# Patient Record
Sex: FEMALE | Race: WHITE | NOT HISPANIC OR LATINO | Employment: UNEMPLOYED | URBAN - NONMETROPOLITAN AREA
[De-identification: names, ages, dates, MRNs, and addresses within clinical notes are randomized per-mention and may not be internally consistent; named-entity substitution may affect disease eponyms.]

---

## 2019-08-07 ENCOUNTER — OFFICE VISIT (OUTPATIENT)
Dept: SURGERY | Facility: CLINIC | Age: 29
End: 2019-08-07

## 2019-08-07 ENCOUNTER — OFFICE VISIT (OUTPATIENT)
Dept: FAMILY MEDICINE CLINIC | Facility: CLINIC | Age: 29
End: 2019-08-07

## 2019-08-07 VITALS
BODY MASS INDEX: 18.27 KG/M2 | DIASTOLIC BLOOD PRESSURE: 60 MMHG | TEMPERATURE: 98 F | HEIGHT: 67 IN | WEIGHT: 116.4 LBS | HEART RATE: 72 BPM | SYSTOLIC BLOOD PRESSURE: 110 MMHG

## 2019-08-07 VITALS
SYSTOLIC BLOOD PRESSURE: 110 MMHG | HEIGHT: 67 IN | BODY MASS INDEX: 18.02 KG/M2 | OXYGEN SATURATION: 98 % | HEART RATE: 85 BPM | DIASTOLIC BLOOD PRESSURE: 80 MMHG | WEIGHT: 114.8 LBS

## 2019-08-07 DIAGNOSIS — N64.4 PAINFUL BREASTS: Primary | ICD-10-CM

## 2019-08-07 DIAGNOSIS — R92.8 ABNORMAL MAMMOGRAM OF LEFT BREAST: Primary | ICD-10-CM

## 2019-08-07 PROCEDURE — 99202 OFFICE O/P NEW SF 15 MIN: CPT | Performed by: NURSE PRACTITIONER

## 2019-08-07 PROCEDURE — 99202 OFFICE O/P NEW SF 15 MIN: CPT | Performed by: SURGERY

## 2019-08-07 RX ORDER — IBUPROFEN 800 MG/1
800 TABLET ORAL EVERY 6 HOURS PRN
COMMUNITY

## 2019-08-07 RX ORDER — LIDOCAINE HYDROCHLORIDE AND EPINEPHRINE 10; 10 MG/ML; UG/ML
30 INJECTION, SOLUTION INFILTRATION; PERINEURAL ONCE
Status: CANCELLED | OUTPATIENT
Start: 2019-08-07 | End: 2019-08-07

## 2019-08-07 RX ORDER — DIAZEPAM 2 MG/1
5 TABLET ORAL ONCE
Status: CANCELLED | OUTPATIENT
Start: 2019-08-07 | End: 2019-08-07

## 2019-08-07 RX ORDER — OXYCODONE HYDROCHLORIDE AND ACETAMINOPHEN 5; 325 MG/1; MG/1
1 TABLET ORAL ONCE
Status: CANCELLED | OUTPATIENT
Start: 2019-08-07 | End: 2019-08-07

## 2019-08-07 NOTE — PROGRESS NOTES
"Subjective   Tamara Arenas is a 29 y.o. female.     [facility] Visit    PCP: none listed    CC: \"lumps on breasts\"      Breast Problem   This is a chronic problem. Episode onset: started after she finished breastfeeding about 4 years ago--has had pain and \"lumps\" that have caused concern.  Denies nipple discharge.   The problem occurs daily. The problem has been waxing and waning (worse during ovulation). Pertinent negatives include no abdominal pain, anorexia, arthralgias, change in bowel habit, chest pain, chills, congestion, coughing, diaphoresis, fatigue, fever, headaches, joint swelling, myalgias, nausea, neck pain, numbness, rash, sore throat, swollen glands, urinary symptoms, vertigo, visual change, vomiting or weakness. She has tried nothing (last exam was 4 years ago and was told it was nothing to worry about, never had u/s) for the symptoms.        The following portions of the patient's history were reviewed and updated as appropriate: allergies, current medications, past medical history, past social history, past surgical history and problem list.    Review of Systems   Constitutional: Negative for chills, diaphoresis, fatigue and fever.   HENT: Negative for congestion and sore throat.    Respiratory: Negative for cough.    Cardiovascular: Negative for chest pain.   Gastrointestinal: Negative for abdominal pain, anorexia, change in bowel habit, nausea and vomiting.   Genitourinary: Positive for breast lump and breast pain. Negative for breast discharge and menstrual problem.   Musculoskeletal: Negative for arthralgias, joint swelling, myalgias and neck pain.   Skin: Negative for rash.   Neurological: Negative for dizziness, vertigo, weakness, numbness and headache.   Psychiatric/Behavioral: The patient is nervous/anxious ( long history).      /80   Pulse 85   Ht 170.2 cm (67\")   Wt 52.1 kg (114 lb 12.8 oz)   LMP 08/07/2019 (Exact Date)   SpO2 98%   BMI 17.98 kg/m²     Objective "   Physical Exam   Constitutional: She is oriented to person, place, and time. She appears well-developed and well-nourished. No distress ( very anxious).   Cardiovascular: Normal rate and regular rhythm.   Pulmonary/Chest: Effort normal and breath sounds normal. She has no wheezes. She has no rales.   Neurological: She is alert and oriented to person, place, and time.   Skin: No rash noted.   Psychiatric: Her mood appears anxious ( very anxious, tearful at times).   Nursing note and vitals reviewed.  Breasts: breasts appear normal, no skin or nipple changes or axillary nodes, symmetric fibrous changes in both upper outer quadrants.  Multiple cystic lesions noted on exam bilaterally with tenderness upon palpation.     No results found for this or any previous visit (from the past 24 hour(s)).  No Images in the past 120 days found..      Assessment/Plan   Tamara was seen today for breast problem.    Diagnoses and all orders for this visit:    Painful breasts  -     US breast bilateral complete      Bilateral u/s scheduled for today--will call with results when available.   Discussed probable fibrocystic changes.     Encouraged to establish with PCP for ongoing care/management.

## 2019-08-07 NOTE — PATIENT INSTRUCTIONS

## 2019-08-07 NOTE — PATIENT INSTRUCTIONS
Breast Tenderness  Breast tenderness is a common problem for women of all ages. Breast tenderness may cause mild discomfort to severe pain. The pain usually comes and goes in association with your menstrual cycle, but it can be constant. Breast tenderness has many possible causes, including hormone changes and some medicines. Your health care provider may order tests, such as a mammogram or an ultrasound, to check for any unusual findings. Having breast tenderness usually does not mean that you have breast cancer.  Follow these instructions at home:  Sometimes, reassurance that you do not have breast cancer is all that is needed. In general, follow these home care instructions:  Managing pain and discomfort    · If directed, apply ice to the area:  ? Put ice in a plastic bag.  ? Place a towel between your skin and the bag.  ? Leave the ice on for 20 minutes, 2-3 times a day.  · Make sure you are wearing a supportive bra, especially during exercise. You may also want to wear a supportive bra while sleeping if your breasts are very tender.  Medicines  · Take over-the-counter and prescription medicines only as told by your health care provider. If the cause of your pain is infection, you may be prescribed an antibiotic medicine.  · If you were prescribed an antibiotic, take it as told by your health care provider. Do not stop taking the antibiotic even if you start to feel better.  General instructions    · Your health care provider may recommend that you reduce the amount of fat in your diet. You can do this by:  ? Limiting fried foods.  ? Cooking foods using methods, such as baking, boiling, grilling, and broiling.  · Decrease the amount of caffeine in your diet. You can do this by drinking more water and choosing caffeine-free options.  · Keep a log of the days and times when your breasts are most tender.  · Ask your health care provider how to do breast exams at home. This will help you notice if you have an  unusual growth or lump.  Contact a health care provider if:  · Any part of your breast is hard, red, and hot to the touch. This may be a sign of infection.  · You are not breastfeeding and you have fluid, especially blood or pus, coming out of your nipples.  · You have a fever.  · You have a new or painful lump in your breast that remains after your menstrual period ends.  · Your pain does not improve or it gets worse.  · Your pain is interfering with your daily activities.  This information is not intended to replace advice given to you by your health care provider. Make sure you discuss any questions you have with your health care provider.  Document Released: 11/30/2009 Document Revised: 09/15/2017 Document Reviewed: 09/15/2017  [employer] Interactive Patient Education © 2019 [employer]

## 2019-08-08 ENCOUNTER — HOSPITAL ENCOUNTER (OUTPATIENT)
Dept: ULTRASOUND IMAGING | Facility: HOSPITAL | Age: 29
Discharge: HOME | End: 2019-08-08
Admitting: SURGERY

## 2019-08-08 VITALS
OXYGEN SATURATION: 100 % | RESPIRATION RATE: 20 BRPM | BODY MASS INDEX: 17.68 KG/M2 | DIASTOLIC BLOOD PRESSURE: 57 MMHG | HEIGHT: 67 IN | WEIGHT: 112.66 LBS | HEART RATE: 86 BPM | TEMPERATURE: 97.3 F | SYSTOLIC BLOOD PRESSURE: 110 MMHG

## 2019-08-08 DIAGNOSIS — R92.8 ABNORMAL MAMMOGRAM OF LEFT BREAST: ICD-10-CM

## 2019-08-08 PROCEDURE — A4648 IMPLANTABLE TISSUE MARKER: HCPCS

## 2019-08-08 PROCEDURE — 88305 TISSUE EXAM BY PATHOLOGIST: CPT | Performed by: PATHOLOGY

## 2019-08-08 PROCEDURE — 19083 BX BREAST 1ST LESION US IMAG: CPT | Performed by: SURGERY

## 2019-08-08 PROCEDURE — 88305 TISSUE EXAM BY PATHOLOGIST: CPT | Performed by: SURGERY

## 2019-08-08 RX ORDER — SODIUM CHLORIDE 9 MG/ML
100 INJECTION, SOLUTION INTRAVENOUS CONTINUOUS
Status: DISCONTINUED | OUTPATIENT
Start: 2019-08-08 | End: 2019-08-09 | Stop reason: HOSPADM

## 2019-08-08 RX ORDER — DIAZEPAM 5 MG/1
5 TABLET ORAL ONCE
Status: COMPLETED | OUTPATIENT
Start: 2019-08-08 | End: 2019-08-08

## 2019-08-08 RX ORDER — OXYCODONE HYDROCHLORIDE AND ACETAMINOPHEN 5; 325 MG/1; MG/1
1 TABLET ORAL ONCE
Status: COMPLETED | OUTPATIENT
Start: 2019-08-08 | End: 2019-08-08

## 2019-08-08 RX ORDER — LIDOCAINE HYDROCHLORIDE AND EPINEPHRINE 10; 10 MG/ML; UG/ML
30 INJECTION, SOLUTION INFILTRATION; PERINEURAL ONCE
Status: COMPLETED | OUTPATIENT
Start: 2019-08-08 | End: 2019-08-08

## 2019-08-08 RX ADMIN — SODIUM CHLORIDE 100 ML/HR: 900 INJECTION, SOLUTION INTRAVENOUS at 12:00

## 2019-08-08 RX ADMIN — LIDOCAINE HYDROCHLORIDE,EPINEPHRINE BITARTRATE 20 ML: 10; .01 INJECTION, SOLUTION INFILTRATION; PERINEURAL at 11:43

## 2019-08-08 RX ADMIN — DIAZEPAM 5 MG: 5 TABLET ORAL at 11:18

## 2019-08-08 RX ADMIN — OXYCODONE HYDROCHLORIDE AND ACETAMINOPHEN 1 TABLET: 5; 325 TABLET ORAL at 11:18

## 2019-08-08 NOTE — PROGRESS NOTES
Chief Complaint: This is a 29 y.o. woman seen in consultation for abnormal breast imaging LEFT side.    History of Present Illness:  Patient has noted no new masses, skin changes, nipple discharge, nipple changes prior to her most recent imaging.  Her most recent imaging includes the following:   Study Result        PROCEDURE: US BREAST BILATERAL     TECHNIQUE:  Grayscale and color Doppler ultrasound imaging of the  palpable areas of concern in both breasts     COMPARISON: None     HISTORY: bilateral breast pain, knots, N64.4 Mastodynia     FINDINGS:  Imaging of the right breast was performed at each clock face in a  radial fashion, including the 3:00 axis area of palpable concern  showing no abnormality. Additional imaging shows a hypoechoic  nodule with smooth margins and gentle lobulations without  posterior acoustic shadowing, oriented wider than tall, located  in the 9:00 axis, 9 cm from the nipple, probably representing a  fibroadenoma.     Imaging of the left breast was also performed at each clock face  and radial fashion. Imaging of the palpable area of concern of  the left breast in the 11:00 axis shows a hypoechoic irregular  shaped nodule with irregular margins and internal punctate  echogenic foci measuring 0.7 x 2.5 x 2 cm, not clearly benign and  mildly suspicious for neoplasm. Ultrasound-guided biopsy is  recommended for this lesion.     IMPRESSION:  CONCLUSION:   Imaging of the palpable area of concern of the left breast in the  11:00 axis shows a hypoechoic irregular shaped nodule with  irregular margins and internal punctate echogenic foci measuring  0.7 x 2.5 x 2 cm, not clearly benign and mildly suspicious for  neoplasm. Ultrasound-guided biopsy is recommended for this  lesion.     Probable fibroadenoma in the 9:00 axis of the right breast, 9 cm  from the nipple. Recommend six month follow-up.      BI-RADS Category 4: Suspicious abnormality.  Biopsy should be  considered.     Findings and  recommendations  discussed with Dr. Weaver on 8/7/2019  3:35 PM CDT     Electronically signed by:  Iron Fink MD  8/7/2019 3:35 PM CDT  Workstation: [id number]     ( I have personally reviewed the breast imaging and concur with the findings of the radiologist- BiRADS 4)    Breast Cancer History: None.  Breast Operations, and year: None  She has her uterus and ovaries, is postmenopausal, and takes nor hormones.    She is here for evaluation.    No past medical history on file.  No past surgical history on file.  Prior to Admission medications    Medication Sig Start Date End Date Taking? Authorizing Provider   ibuprofen (ADVIL,MOTRIN) 800 MG tablet Take 800 mg by mouth Every 6 (Six) Hours As Needed for Mild Pain .    Provider, MD Yazan     No Known Allergies  No family history on file.  Social History     Socioeconomic History   • Marital status:      Spouse name: Not on file   • Number of children: Not on file   • Years of education: Not on file   • Highest education level: Not on file   Tobacco Use   • Smoking status: Former Smoker   • Smokeless tobacco: Never Used   Substance and Sexual Activity   • Alcohol use: No     Frequency: Never   • Drug use: No   • Sexual activity: Not Currently     Review of Systems   Constitutional: Negative for appetite change, chills, fever and unexpected weight change.   HENT: Negative for hearing loss, nosebleeds and trouble swallowing.    Eyes: Negative for visual disturbance.   Respiratory: Negative for apnea, cough, choking, chest tightness, shortness of breath, wheezing and stridor.    Cardiovascular: Negative for chest pain, palpitations and leg swelling.   Gastrointestinal: Negative for abdominal distention, abdominal pain, blood in stool, constipation, diarrhea, nausea and vomiting.   Endocrine: Negative for cold intolerance, heat intolerance, polydipsia, polyphagia and polyuria.   Genitourinary: Negative for difficulty urinating, dysuria, frequency, hematuria and  urgency.   Musculoskeletal: Negative for arthralgias, back pain, myalgias and neck pain.   Skin: Negative for color change, pallor and rash.   Allergic/Immunologic: Negative for immunocompromised state.   Neurological: Negative for dizziness, seizures, syncope, light-headedness, numbness and headaches.   Hematological: Negative for adenopathy.   Psychiatric/Behavioral: Negative for suicidal ideas. The patient is not nervous/anxious.      Physical Exam   Constitutional: She is oriented to person, place, and time. She appears well-developed and well-nourished. No distress.   HENT:   Head: Normocephalic and atraumatic.   Mouth/Throat: No oropharyngeal exudate.   Eyes: EOM are normal. Pupils are equal, round, and reactive to light. No scleral icterus.   Neck: Normal range of motion. Neck supple. No JVD present. No tracheal deviation present. No thyromegaly present.   Cardiovascular: Normal rate, regular rhythm and normal heart sounds.   Pulmonary/Chest: Effort normal and breath sounds normal. No stridor. No respiratory distress. She has no wheezes. She has no rales. She exhibits no tenderness. Right breast exhibits no inverted nipple, no mass, no nipple discharge, no skin change and no tenderness. Left breast exhibits no inverted nipple, no mass, no nipple discharge, no skin change and no tenderness. Breasts are symmetrical. There is no breast swelling.   Genitourinary: No breast tenderness, discharge or bleeding.   Musculoskeletal: Normal range of motion. She exhibits no edema, tenderness or deformity.   Lymphadenopathy:     She has no cervical adenopathy.     She has no axillary adenopathy.   Neurological: She is alert and oriented to person, place, and time.   Skin: Skin is warm and dry. No rash noted. She is not diaphoretic. No erythema. No pallor.   Psychiatric: She has a normal mood and affect. Her behavior is normal. Judgment normal.   Vitals reviewed.    Vitals:    08/07/19 1542   BP: 110/60   Pulse: 72   Temp:  98 °F (36.7 °C)     Assessment:    Tamara was seen today for abnormal breast imaging.    Diagnoses and all orders for this visit:    Abnormal mammogram of left breast  -     US Guided Breast Biopsy With & Without Device initial Left  -     lidocaine-EPINEPHrine (XYLOCAINE W/EPI) 1 %-1:984829 injection 30 mL  -     oxyCODONE-acetaminophen (PERCOCET) 5-325 MG per tablet 1 tablet  -     diazePAM (VALIUM) tablet 5 mg    Other orders  -     Obtain Informed Consent; Standing  -     Tissue Pathology Exam; Standing        Plan:  1. US mammotome of the LEFT breast with clip placement.    The following were discussed:    What are the indications that have led your doctor to the opinion that an operation is necessary?    Breast imaging has determined an abnormal area requiring biopsy.    What, if any, alternative treatments are available for your condition?    Alternatively, open biopsy in the operating room may be performed under sedation or general anesthesia. Observation is not a good option.    What will be the likely result if you don't have the operation?    There is a possibility of missing a breast cancer diagnosis.    What are the basic procedures involved in the operation?    The patient will be placed supine for the procedure. A small incision will be made under local anesthesia, and a special, large needle will be used to perform the biopsy.   A permanent titanium clip will be placed in the breast to carmen the site of biopsy should further surgery be necessary.    What are the risks?    The risks of bleeding, infection, the possible need for open biopsy or other procedure, scarring, and poor wound healing are explained. Bruising almost always occurs. There is a low transfusion risk. The risks of chronic pain are, likewise, low.     How is the operation expected to improve your health or quality of life?    The lesion can usually be determined to be benign or malignant. Follow up xrays or further open excision may  be necessary depending on the pathology.    Is hospitalization necessary and, if so, how long can you expect to be hospitalized?    This procedure is performed on an outpatient basis.    What can you expect during your recovery period?    Minimal pain is usually controlled with non-narcotic analgesia.    When can you expect to resume normal activities?    Normal activity may be resumed the following day.    Are there likely to be residual effects from the operation?    Usually, there are no residual effects following biiopsy.    .   All questions were answered. The patient agrees to operation.                This document has been electronically signed by Torsten Weaver MD on August 7, 2019 11:47 PM

## 2019-08-08 NOTE — H&P (VIEW-ONLY)
Chief Complaint: This is a 29 y.o. woman seen in consultation for abnormal breast imaging LEFT side.    History of Present Illness:  Patient has noted no new masses, skin changes, nipple discharge, nipple changes prior to her most recent imaging.  Her most recent imaging includes the following:   Study Result        PROCEDURE: US BREAST BILATERAL     TECHNIQUE:  Grayscale and color Doppler ultrasound imaging of the  palpable areas of concern in both breasts     COMPARISON: None     HISTORY: bilateral breast pain, knots, N64.4 Mastodynia     FINDINGS:  Imaging of the right breast was performed at each clock face in a  radial fashion, including the 3:00 axis area of palpable concern  showing no abnormality. Additional imaging shows a hypoechoic  nodule with smooth margins and gentle lobulations without  posterior acoustic shadowing, oriented wider than tall, located  in the 9:00 axis, 9 cm from the nipple, probably representing a  fibroadenoma.     Imaging of the left breast was also performed at each clock face  and radial fashion. Imaging of the palpable area of concern of  the left breast in the 11:00 axis shows a hypoechoic irregular  shaped nodule with irregular margins and internal punctate  echogenic foci measuring 0.7 x 2.5 x 2 cm, not clearly benign and  mildly suspicious for neoplasm. Ultrasound-guided biopsy is  recommended for this lesion.     IMPRESSION:  CONCLUSION:   Imaging of the palpable area of concern of the left breast in the  11:00 axis shows a hypoechoic irregular shaped nodule with  irregular margins and internal punctate echogenic foci measuring  0.7 x 2.5 x 2 cm, not clearly benign and mildly suspicious for  neoplasm. Ultrasound-guided biopsy is recommended for this  lesion.     Probable fibroadenoma in the 9:00 axis of the right breast, 9 cm  from the nipple. Recommend six month follow-up.      BI-RADS Category 4: Suspicious abnormality.  Biopsy should be  considered.     Findings and  recommendations  discussed with Dr. Weaver on 8/7/2019  3:35 PM CDT     Electronically signed by:  Iron Fink MD  8/7/2019 3:35 PM CDT  Workstation: [id number]     ( I have personally reviewed the breast imaging and concur with the findings of the radiologist- BiRADS 4)    Breast Cancer History: None.  Breast Operations, and year: None  She has her uterus and ovaries, is postmenopausal, and takes nor hormones.    She is here for evaluation.    No past medical history on file.  No past surgical history on file.  Prior to Admission medications    Medication Sig Start Date End Date Taking? Authorizing Provider   ibuprofen (ADVIL,MOTRIN) 800 MG tablet Take 800 mg by mouth Every 6 (Six) Hours As Needed for Mild Pain .    Provider, MD Yazan     No Known Allergies  No family history on file.  Social History     Socioeconomic History   • Marital status:      Spouse name: Not on file   • Number of children: Not on file   • Years of education: Not on file   • Highest education level: Not on file   Tobacco Use   • Smoking status: Former Smoker   • Smokeless tobacco: Never Used   Substance and Sexual Activity   • Alcohol use: No     Frequency: Never   • Drug use: No   • Sexual activity: Not Currently     Review of Systems   Constitutional: Negative for appetite change, chills, fever and unexpected weight change.   HENT: Negative for hearing loss, nosebleeds and trouble swallowing.    Eyes: Negative for visual disturbance.   Respiratory: Negative for apnea, cough, choking, chest tightness, shortness of breath, wheezing and stridor.    Cardiovascular: Negative for chest pain, palpitations and leg swelling.   Gastrointestinal: Negative for abdominal distention, abdominal pain, blood in stool, constipation, diarrhea, nausea and vomiting.   Endocrine: Negative for cold intolerance, heat intolerance, polydipsia, polyphagia and polyuria.   Genitourinary: Negative for difficulty urinating, dysuria, frequency, hematuria and  urgency.   Musculoskeletal: Negative for arthralgias, back pain, myalgias and neck pain.   Skin: Negative for color change, pallor and rash.   Allergic/Immunologic: Negative for immunocompromised state.   Neurological: Negative for dizziness, seizures, syncope, light-headedness, numbness and headaches.   Hematological: Negative for adenopathy.   Psychiatric/Behavioral: Negative for suicidal ideas. The patient is not nervous/anxious.      Physical Exam   Constitutional: She is oriented to person, place, and time. She appears well-developed and well-nourished. No distress.   HENT:   Head: Normocephalic and atraumatic.   Mouth/Throat: No oropharyngeal exudate.   Eyes: EOM are normal. Pupils are equal, round, and reactive to light. No scleral icterus.   Neck: Normal range of motion. Neck supple. No JVD present. No tracheal deviation present. No thyromegaly present.   Cardiovascular: Normal rate, regular rhythm and normal heart sounds.   Pulmonary/Chest: Effort normal and breath sounds normal. No stridor. No respiratory distress. She has no wheezes. She has no rales. She exhibits no tenderness. Right breast exhibits no inverted nipple, no mass, no nipple discharge, no skin change and no tenderness. Left breast exhibits no inverted nipple, no mass, no nipple discharge, no skin change and no tenderness. Breasts are symmetrical. There is no breast swelling.   Genitourinary: No breast tenderness, discharge or bleeding.   Musculoskeletal: Normal range of motion. She exhibits no edema, tenderness or deformity.   Lymphadenopathy:     She has no cervical adenopathy.     She has no axillary adenopathy.   Neurological: She is alert and oriented to person, place, and time.   Skin: Skin is warm and dry. No rash noted. She is not diaphoretic. No erythema. No pallor.   Psychiatric: She has a normal mood and affect. Her behavior is normal. Judgment normal.   Vitals reviewed.    Vitals:    08/07/19 1542   BP: 110/60   Pulse: 72   Temp:  98 °F (36.7 °C)     Assessment:    Tamara was seen today for abnormal breast imaging.    Diagnoses and all orders for this visit:    Abnormal mammogram of left breast  -     US Guided Breast Biopsy With & Without Device initial Left  -     lidocaine-EPINEPHrine (XYLOCAINE W/EPI) 1 %-1:800719 injection 30 mL  -     oxyCODONE-acetaminophen (PERCOCET) 5-325 MG per tablet 1 tablet  -     diazePAM (VALIUM) tablet 5 mg    Other orders  -     Obtain Informed Consent; Standing  -     Tissue Pathology Exam; Standing        Plan:  1. US mammotome of the LEFT breast with clip placement.    The following were discussed:    What are the indications that have led your doctor to the opinion that an operation is necessary?    Breast imaging has determined an abnormal area requiring biopsy.    What, if any, alternative treatments are available for your condition?    Alternatively, open biopsy in the operating room may be performed under sedation or general anesthesia. Observation is not a good option.    What will be the likely result if you don't have the operation?    There is a possibility of missing a breast cancer diagnosis.    What are the basic procedures involved in the operation?    The patient will be placed supine for the procedure. A small incision will be made under local anesthesia, and a special, large needle will be used to perform the biopsy.   A permanent titanium clip will be placed in the breast to carmen the site of biopsy should further surgery be necessary.    What are the risks?    The risks of bleeding, infection, the possible need for open biopsy or other procedure, scarring, and poor wound healing are explained. Bruising almost always occurs. There is a low transfusion risk. The risks of chronic pain are, likewise, low.     How is the operation expected to improve your health or quality of life?    The lesion can usually be determined to be benign or malignant. Follow up xrays or further open excision may  be necessary depending on the pathology.    Is hospitalization necessary and, if so, how long can you expect to be hospitalized?    This procedure is performed on an outpatient basis.    What can you expect during your recovery period?    Minimal pain is usually controlled with non-narcotic analgesia.    When can you expect to resume normal activities?    Normal activity may be resumed the following day.    Are there likely to be residual effects from the operation?    Usually, there are no residual effects following biiopsy.    .   All questions were answered. The patient agrees to operation.                This document has been electronically signed by Torsten Weaver MD on August 7, 2019 11:47 PM

## 2019-08-09 LAB
LAB AP CASE REPORT: NORMAL
LAB AP INTRADEPARTMENTAL CONSULT: NORMAL
PATH REPORT.FINAL DX SPEC: NORMAL
PATH REPORT.GROSS SPEC: NORMAL

## 2019-08-12 DIAGNOSIS — R92.8 ABNORMAL MAMMOGRAM: Primary | ICD-10-CM

## 2019-08-30 ENCOUNTER — OFFICE VISIT (OUTPATIENT)
Dept: FAMILY MEDICINE CLINIC | Facility: CLINIC | Age: 29
End: 2019-08-30

## 2019-08-30 VITALS
WEIGHT: 118.9 LBS | HEIGHT: 67 IN | DIASTOLIC BLOOD PRESSURE: 70 MMHG | SYSTOLIC BLOOD PRESSURE: 110 MMHG | BODY MASS INDEX: 18.66 KG/M2 | OXYGEN SATURATION: 99 % | HEART RATE: 85 BPM | TEMPERATURE: 97.7 F

## 2019-08-30 DIAGNOSIS — F41.9 ANXIETY: Primary | ICD-10-CM

## 2019-08-30 PROCEDURE — 99214 OFFICE O/P EST MOD 30 MIN: CPT | Performed by: NURSE PRACTITIONER

## 2019-08-30 NOTE — PROGRESS NOTES
Subjective   Tamara Arenas is a 29 y.o. female.     Here to establish.  She has a long history of anxiety and depression.  She was on medications as a child and has been in [facility] before.  She is not interested in any medication at this time in spite of the fact that she has frequent anxiety attacks.      Anxiety   Presents for initial visit. Onset was more than 5 years ago. The problem has been unchanged. Symptoms include depressed mood, excessive worry, nervous/anxious behavior and panic. Patient reports no chest pain, compulsions, confusion, decreased concentration, dizziness, dry mouth, feeling of choking, hyperventilation, impotence, insomnia, irritability, malaise, muscle tension, nausea, obsessions, palpitations, restlessness, shortness of breath or suicidal ideas. Symptoms occur most days. The severity of symptoms is interfering with daily activities. The symptoms are aggravated by social activities, specific phobias and family issues. The quality of sleep is fair. Nighttime awakenings: occasional.     There are no known risk factors. Her past medical history is significant for anxiety/panic attacks. Past treatments include benzodiazephines. Compliance with prior treatments has been poor.        The following portions of the patient's history were reviewed and updated as appropriate: allergies, current medications, past family history, past medical history, past social history, past surgical history and problem list.    Review of Systems   Constitutional: Negative.  Negative for irritability.   HENT: Negative.    Eyes: Negative.    Respiratory: Negative.  Negative for shortness of breath.    Cardiovascular: Negative.  Negative for chest pain and palpitations.   Gastrointestinal: Negative.  Negative for nausea.   Endocrine: Negative.    Genitourinary: Negative.  Negative for impotence.   Musculoskeletal: Negative.    Skin: Negative.    Allergic/Immunologic: Negative.    Neurological: Negative for  dizziness and confusion.   Hematological: Negative.    Psychiatric/Behavioral: Positive for depressed mood. Negative for decreased concentration and suicidal ideas. The patient is nervous/anxious. The patient does not have insomnia.        Objective   Physical Exam   Constitutional: She is oriented to person, place, and time. She appears well-developed and well-nourished. No distress.   HENT:   Head: Normocephalic and atraumatic.   Mouth/Throat: No oropharyngeal exudate.   Eyes: Pupils are equal, round, and reactive to light.   Neck: Normal range of motion. Neck supple. No thyromegaly present.   Cardiovascular: Normal rate, regular rhythm and normal heart sounds. Exam reveals no friction rub.   No murmur heard.  Pulmonary/Chest: Effort normal and breath sounds normal. No respiratory distress. She has no wheezes. She has no rales.   Abdominal: Soft.   Musculoskeletal: Normal range of motion.   Neurological: She is alert and oriented to person, place, and time.   Skin: Skin is warm and dry.   Psychiatric: She has a normal mood and affect. Her behavior is normal. Judgment and thought content normal.   Slightly anxious   Nursing note and vitals reviewed.        Assessment/Plan   Tamara was seen today for establish care.    Diagnoses and all orders for this visit:    Anxiety  Comments:  she may try otc benadryl for anxiety attacks.  says she has taken it for other things and was able to tell it calmed her down.    she will return for a pap.